# Patient Record
(demographics unavailable — no encounter records)

---

## 2024-10-28 NOTE — ASSESSMENT
[FreeTextEntry1] : Ms. Garcia is a 59 y.o. woman with a 1.2 cyst of the pancreatic body. This is consistent with IPMN. The cyst does not feature neither high risk stigmata (jaundice, mural nodule 5 mm, main duct 10 mm), nor worrisome signs (new onset diabetes, mural nodules, enhancing cyst walls, change in caliber of pancreatic duct, significant cyst growth), and is therefore at very low risk for malignancy or malignant transformation - about 0-2% at 5 years (Kary S, et al. Fukuoka criteria accurately predict risk for adverse outcomes during follow-up of pancreatic cysts presumed to be intraductal papillary mucinous neoplasms. Gut 2017;66:9852-0254). In these settings watchful follow up is appropriate. The most recent study (MR on 10/03/2024) shows stable 9 mm cluster of microcysts in the uncinate process. Scattered, other nonenhancing microcysts are seen in the pancreatic parenchyma, and are also stable. Normal caliber of pancreatic duct. At this point we will obtain an MRI annually until no longer a surgical candidate. All questions answered. Patient will f/u with me after completion of MR in October 2025.

## 2024-10-28 NOTE — PHYSICAL EXAM
[Normal Neck Lymph Nodes] : normal neck lymph nodes  [Normal Supraclavicular Lymph Nodes] : normal supraclavicular lymph nodes [Normal] : oriented to person, place and time, with appropriate affect [de-identified] : anicteric [de-identified] : S1,S2, regular rate and rhythm. No murmurs heard. [de-identified] : warm and dry

## 2024-10-28 NOTE — HISTORY OF PRESENT ILLNESS
[de-identified] : Patient Name: PARTH ROCHA MRN: [id number] Referring Provider: none Date: 10/28/2024   She presents for a follow up of Diagnosis: IPMN  60 yo F with pmhx HTN, HLD, IBS, psoriasis, Lupus, and obesity. Pt obtained a CT chest in November to work up her SOB and it incidentally showed some GB abnormalities. She subsequently got an ultrasound and MRI abdomen which revealed no significant gallbladder pathology, but rather multiple pancreatic cysts, largest measuring 1.2 cm in the pancreatic body. Patient presents today for follow-up after imaging done on 10/03/24. MR MRCP [facility]WO showing stable microcysts in the pancreas; stable small; cluster of microcysts in the uncinate process. Likely a side branch type IPMN and hepatic stenosis  Work up: 3/28/24: US abd: gallbladder wall thickening and calcification, no definitive comet tail artifact, ddx adenomyomatosis, cannot exclude primary gallbladder malignancy 4/18/24: MR Abd: No significant gallbladder pathology. Gallbladder fundal phrygian cap. No cholelithiasis. No evidence of gallbladder polyp or carcinoma. Multiple cysts /dilated pancreatic side branches, largest measures 1.2 cm in pancreatic body. Could represent side branch IPMN or chronic pancreatitis. 10/03/2024: MR MRCP showed Stable microcysts in the pancreas; stable small; cluster of microcysts in the uncinate process. Likely a side branch type IPMN.  Of note, she informed me today that she had genetic testing 10+ years ago for a family history of breast and ovarian cancer and at the time she tested positive for BRCA2 genetic mutation. She is interested in updated genetic testing.   Currently, Ms. ROCHA feels well except for complaints related to IBS.

## 2024-10-28 NOTE — HISTORY OF PRESENT ILLNESS
[de-identified] : Patient Name: PARTH ROCHA MRN: [id number] Referring Provider: none Date: 10/28/2024   She presents for a follow up of Diagnosis: IPMN  60 yo F with pmhx HTN, HLD, IBS, psoriasis, Lupus, and obesity. Pt obtained a CT chest in November to work up her SOB and it incidentally showed some GB abnormalities. She subsequently got an ultrasound and MRI abdomen which revealed no significant gallbladder pathology, but rather multiple pancreatic cysts, largest measuring 1.2 cm in the pancreatic body. Patient presents today for follow-up after imaging done on 10/03/24. MR MRCP [facility]WO showing stable microcysts in the pancreas; stable small; cluster of microcysts in the uncinate process. Likely a side branch type IPMN and hepatic stenosis  Work up: 3/28/24: US abd: gallbladder wall thickening and calcification, no definitive comet tail artifact, ddx adenomyomatosis, cannot exclude primary gallbladder malignancy 4/18/24: MR Abd: No significant gallbladder pathology. Gallbladder fundal phrygian cap. No cholelithiasis. No evidence of gallbladder polyp or carcinoma. Multiple cysts /dilated pancreatic side branches, largest measures 1.2 cm in pancreatic body. Could represent side branch IPMN or chronic pancreatitis. 10/03/2024: MR MRCP showed Stable microcysts in the pancreas; stable small; cluster of microcysts in the uncinate process. Likely a side branch type IPMN.  Of note, she informed me today that she had genetic testing 10+ years ago for a family history of breast and ovarian cancer and at the time she tested positive for BRCA2 genetic mutation. She is interested in updated genetic testing.   Currently, Ms. ROCHA feels well except for complaints related to IBS.

## 2024-10-28 NOTE — PHYSICAL EXAM
[Normal Neck Lymph Nodes] : normal neck lymph nodes  [Normal Supraclavicular Lymph Nodes] : normal supraclavicular lymph nodes [Normal] : oriented to person, place and time, with appropriate affect [de-identified] : anicteric [de-identified] : S1,S2, regular rate and rhythm. No murmurs heard. [de-identified] : warm and dry

## 2024-10-28 NOTE — ASSESSMENT
[FreeTextEntry1] : Ms. Garcia is a 59 y.o. woman with a 1.2 cyst of the pancreatic body. This is consistent with IPMN. The cyst does not feature neither high risk stigmata (jaundice, mural nodule 5 mm, main duct 10 mm), nor worrisome signs (new onset diabetes, mural nodules, enhancing cyst walls, change in caliber of pancreatic duct, significant cyst growth), and is therefore at very low risk for malignancy or malignant transformation - about 0-2% at 5 years (Kary S, et al. Fukuoka criteria accurately predict risk for adverse outcomes during follow-up of pancreatic cysts presumed to be intraductal papillary mucinous neoplasms. Gut 2017;66:2254-0854). In these settings watchful follow up is appropriate. The most recent study (MR on 10/03/2024) shows stable 9 mm cluster of microcysts in the uncinate process. Scattered, other nonenhancing microcysts are seen in the pancreatic parenchyma, and are also stable. Normal caliber of pancreatic duct. At this point we will obtain an MRI annually until no longer a surgical candidate. All questions answered. Patient will f/u with me after completion of MR in October 2025.